# Patient Record
Sex: FEMALE | Race: BLACK OR AFRICAN AMERICAN | NOT HISPANIC OR LATINO | URBAN - METROPOLITAN AREA
[De-identification: names, ages, dates, MRNs, and addresses within clinical notes are randomized per-mention and may not be internally consistent; named-entity substitution may affect disease eponyms.]

---

## 2023-04-21 LAB
CHLAMYDIA TRACH., AMPLIFIED: NEGATIVE
N. GONORRHEA, AMPLIFIED: NEGATIVE

## 2023-04-22 LAB — URINE CULTURE: NORMAL

## 2023-08-28 PROBLEM — O26.892 HEARTBURN DURING PREGNANCY IN SECOND TRIMESTER (HHS-HCC): Status: ACTIVE | Noted: 2023-08-28

## 2023-08-28 PROBLEM — O92.70 UNSPECIFIED DISORDERS OF LACTATION (HHS-HCC): Status: ACTIVE | Noted: 2023-08-28

## 2023-08-28 PROBLEM — D64.9 MILD ANEMIA: Status: ACTIVE | Noted: 2023-08-28

## 2023-08-28 PROBLEM — Z79.899 LONG-TERM USE OF PLAQUENIL: Status: ACTIVE | Noted: 2023-08-28

## 2023-08-28 PROBLEM — N93.8 DYSFUNCTIONAL UTERINE BLEEDING: Status: ACTIVE | Noted: 2023-08-28

## 2023-08-28 PROBLEM — D50.9 IRON DEFICIENCY ANEMIA OF PREGNANCY (HHS-HCC): Status: ACTIVE | Noted: 2023-08-28

## 2023-08-28 PROBLEM — L30.9 ECZEMA: Status: ACTIVE | Noted: 2017-01-17

## 2023-08-28 PROBLEM — R12 HEARTBURN DURING PREGNANCY IN SECOND TRIMESTER (HHS-HCC): Status: ACTIVE | Noted: 2023-08-28

## 2023-08-28 PROBLEM — Z79.620 ADALIMUMAB (HUMIRA) LONG-TERM USE: Status: ACTIVE | Noted: 2023-08-28

## 2023-08-28 PROBLEM — E55.9 VITAMIN D DEFICIENCY: Status: ACTIVE | Noted: 2023-08-28

## 2023-08-28 PROBLEM — Z79.622 LONG-TERM CURRENT USE OF TOFACITINIB: Status: ACTIVE | Noted: 2023-08-28

## 2023-08-28 PROBLEM — M85.80 OSTEOPENIA: Status: ACTIVE | Noted: 2023-08-28

## 2023-08-28 PROBLEM — R07.9 CHEST PAIN: Status: ACTIVE | Noted: 2023-08-28

## 2023-08-28 PROBLEM — A60.09 GENITAL HERPES AFFECTING PREGNANCY IN THIRD TRIMESTER (HHS-HCC): Status: ACTIVE | Noted: 2023-08-28

## 2023-08-28 PROBLEM — M79.672 CHRONIC PAIN IN LEFT FOOT: Status: ACTIVE | Noted: 2023-08-28

## 2023-08-28 PROBLEM — G89.29 CHRONIC PAIN IN LEFT FOOT: Status: ACTIVE | Noted: 2023-08-28

## 2023-08-28 PROBLEM — Z34.02 PRIMIGRAVIDA, SECOND TRIMESTER (HHS-HCC): Status: ACTIVE | Noted: 2023-08-28

## 2023-08-28 PROBLEM — O99.019 IRON DEFICIENCY ANEMIA OF PREGNANCY (HHS-HCC): Status: ACTIVE | Noted: 2023-08-28

## 2023-08-28 PROBLEM — O98.313 GENITAL HERPES AFFECTING PREGNANCY IN THIRD TRIMESTER (HHS-HCC): Status: ACTIVE | Noted: 2023-08-28

## 2023-08-28 PROBLEM — M19.90 ARTHRITIS: Status: ACTIVE | Noted: 2023-08-28

## 2023-08-28 PROBLEM — A59.9 TRICHOMONAS INFECTION: Status: ACTIVE | Noted: 2023-08-28

## 2023-08-28 PROBLEM — J45.909 ASTHMA (HHS-HCC): Status: ACTIVE | Noted: 2023-08-28

## 2023-08-28 RX ORDER — NORELGESTROMIN AND ETHINYL ESTRADIOL 150; 35 UG/D; UG/D
PATCH TRANSDERMAL
COMMUNITY
Start: 2022-10-27 | End: 2024-02-13 | Stop reason: WASHOUT

## 2023-08-28 RX ORDER — HYDROXYCHLOROQUINE SULFATE 200 MG/1
1 TABLET, FILM COATED ORAL DAILY
COMMUNITY
Start: 2021-05-19 | End: 2024-02-09 | Stop reason: SDUPTHER

## 2023-08-28 RX ORDER — IBUPROFEN 600 MG/1
600 TABLET ORAL EVERY 6 HOURS
COMMUNITY
Start: 2021-08-10

## 2023-08-28 RX ORDER — LEVONORGESTREL 1.5 MG/1
TABLET ORAL
COMMUNITY
Start: 2022-10-27 | End: 2024-02-13 | Stop reason: WASHOUT

## 2023-08-28 RX ORDER — PRENATAL VIT NO.126/IRON/FOLIC 28MG-0.8MG
1 TABLET ORAL DAILY
COMMUNITY
Start: 2021-01-19 | End: 2024-02-13 | Stop reason: WASHOUT

## 2023-08-28 RX ORDER — ALBUTEROL SULFATE 90 UG/1
AEROSOL, METERED RESPIRATORY (INHALATION)
COMMUNITY
Start: 2015-07-22

## 2023-08-28 RX ORDER — ADALIMUMAB 40MG/0.4ML
KIT SUBCUTANEOUS
COMMUNITY
Start: 2022-05-13 | End: 2024-02-13 | Stop reason: SDUPTHER

## 2023-08-28 RX ORDER — DOCUSATE SODIUM 100 MG/1
CAPSULE, LIQUID FILLED ORAL
COMMUNITY
Start: 2021-08-10 | End: 2024-02-13 | Stop reason: WASHOUT

## 2023-08-28 RX ORDER — TRIAMCINOLONE ACETONIDE 1 MG/G
CREAM TOPICAL
COMMUNITY
Start: 2015-07-01

## 2023-08-28 RX ORDER — ASPIRIN 325 MG
1 TABLET, DELAYED RELEASE (ENTERIC COATED) ORAL
COMMUNITY
Start: 2022-05-13 | End: 2024-02-13 | Stop reason: WASHOUT

## 2023-08-28 RX ORDER — VIT C/E/ZN/COPPR/LUTEIN/ZEAXAN 250MG-90MG
1 CAPSULE ORAL DAILY
COMMUNITY
Start: 2021-12-17

## 2023-08-28 RX ORDER — CALCIUM CARBONATE 600 MG
1 TABLET ORAL DAILY
COMMUNITY

## 2023-08-28 RX ORDER — ACYCLOVIR 400 MG/1
400 TABLET ORAL 3 TIMES DAILY
COMMUNITY
End: 2024-02-13 | Stop reason: WASHOUT

## 2023-08-28 RX ORDER — METRONIDAZOLE 7.5 MG/G
GEL VAGINAL
COMMUNITY
Start: 2022-10-18 | End: 2024-02-13 | Stop reason: WASHOUT

## 2023-08-28 RX ORDER — FERROUS SULFATE 325(65) MG
TABLET ORAL
COMMUNITY
Start: 2021-08-10 | End: 2024-02-13 | Stop reason: WASHOUT

## 2023-09-15 LAB
CHLAMYDIA TRACH., AMPLIFIED: NEGATIVE
N. GONORRHEA, AMPLIFIED: NEGATIVE
TRICHOMONAS VAGINALIS: NEGATIVE

## 2023-10-03 ENCOUNTER — APPOINTMENT (OUTPATIENT)
Dept: OBSTETRICS AND GYNECOLOGY | Facility: HOSPITAL | Age: 23
End: 2023-10-03
Payer: COMMERCIAL

## 2023-10-19 ENCOUNTER — PHARMACY VISIT (OUTPATIENT)
Dept: PHARMACY | Facility: CLINIC | Age: 23
End: 2023-10-19
Payer: MEDICAID

## 2023-10-19 ENCOUNTER — SPECIALTY PHARMACY (OUTPATIENT)
Dept: PHARMACY | Facility: CLINIC | Age: 23
End: 2023-10-19

## 2023-10-19 PROCEDURE — RXMED WILLOW AMBULATORY MEDICATION CHARGE

## 2023-11-07 ENCOUNTER — SPECIALTY PHARMACY (OUTPATIENT)
Dept: PHARMACY | Facility: CLINIC | Age: 23
End: 2023-11-07

## 2023-11-09 ENCOUNTER — PHARMACY VISIT (OUTPATIENT)
Dept: PHARMACY | Facility: CLINIC | Age: 23
End: 2023-11-09
Payer: MEDICAID

## 2023-11-15 ENCOUNTER — SPECIALTY PHARMACY (OUTPATIENT)
Dept: PHARMACY | Facility: CLINIC | Age: 23
End: 2023-11-15

## 2023-11-15 NOTE — PROGRESS NOTES
We are unable to reach the patient. We left multiple voice meesages to set up delivery for her Humira 40 mg $0.00. This task is now cancelled.

## 2023-11-24 ENCOUNTER — PHARMACY VISIT (OUTPATIENT)
Dept: PHARMACY | Facility: CLINIC | Age: 23
End: 2023-11-24
Payer: MEDICAID

## 2023-11-24 ENCOUNTER — SPECIALTY PHARMACY (OUTPATIENT)
Dept: PHARMACY | Facility: CLINIC | Age: 23
End: 2023-11-24

## 2023-11-24 PROCEDURE — RXMED WILLOW AMBULATORY MEDICATION CHARGE

## 2023-12-19 ENCOUNTER — SPECIALTY PHARMACY (OUTPATIENT)
Dept: PHARMACY | Facility: CLINIC | Age: 23
End: 2023-12-19

## 2023-12-19 ENCOUNTER — PHARMACY VISIT (OUTPATIENT)
Dept: PHARMACY | Facility: CLINIC | Age: 23
End: 2023-12-19
Payer: MEDICAID

## 2023-12-19 PROCEDURE — RXMED WILLOW AMBULATORY MEDICATION CHARGE

## 2024-01-17 PROCEDURE — RXMED WILLOW AMBULATORY MEDICATION CHARGE

## 2024-01-19 ENCOUNTER — PHARMACY VISIT (OUTPATIENT)
Dept: PHARMACY | Facility: CLINIC | Age: 24
End: 2024-01-19
Payer: MEDICAID

## 2024-02-02 ENCOUNTER — TELEPHONE (OUTPATIENT)
Dept: RHEUMATOLOGY | Facility: CLINIC | Age: 24
End: 2024-02-02
Payer: COMMERCIAL

## 2024-02-02 NOTE — TELEPHONE ENCOUNTER
PT CALLED STATING HER INJECTION MISFIRED. HAVING SEVERE RT ARM PAIN. WASN'T ABLE TO GET ANY OF THE MEDICATION. WANTS TO KNOW WHAT SHE SHOULD DO OR IF YOU CAN ORDER HER SOME MEDICATION FOR THE RT ARM PAIN. CURRENTLY OUT OF MEDICATION.

## 2024-02-08 ENCOUNTER — PHARMACY VISIT (OUTPATIENT)
Dept: PHARMACY | Facility: CLINIC | Age: 24
End: 2024-02-08
Payer: MEDICAID

## 2024-02-08 ENCOUNTER — SPECIALTY PHARMACY (OUTPATIENT)
Dept: PHARMACY | Facility: CLINIC | Age: 24
End: 2024-02-08

## 2024-02-08 PROBLEM — M06.9 RHEUMATOID ARTHRITIS INVOLVING MULTIPLE SITES (MULTI): Status: ACTIVE | Noted: 2024-02-08

## 2024-02-08 PROCEDURE — RXMED WILLOW AMBULATORY MEDICATION CHARGE

## 2024-02-08 NOTE — PROGRESS NOTES
Subjective   Patient ID: Romaine Garcia is a 24 y.o. female who presents for Rheumatoid Arthritis (FUV- Pt had misfire last week while injecting Humira. Pt was giving a sample last Friday to be able to stay on track with dosing. Pt would like to discuss adding Hydroxycholroquine.).    HPI  Last seen 1 year ago     · Rheumatoid arthritis (714.0) (M06.9)   · Breast feeding status of mother (V24.1) (Z39.1)   · Vitamin D deficiency (268.9) (E55.9)   · Adalimumab (Humira) long-term use (V58.69) (Z79.620)   · Long-term use of Plaquenil (V58.69) (Z79.899)    Ok until 3 weeks pain swelling hands and wrists  Off Plaquenil for mos ran out   Humira helps     PAIN: wirst and r elbow  SWELLING: +  AM GEL: some days  SIDE EFFECTS OF MED: no    LAST LAB  Lab Results   Component Value Date     (H) 06/07/2019    SEDRATE 26 (H) 05/10/2022    CRP 0.73 05/10/2022         PHYSICAL EXAM  NODES   HEART  LUNGS  ABDOMEN   VASCULAR  NEURO   SKIN  JOINTS   There is currently no information documented on the homunculus. Go to the Rheumatology activity and complete the homunculus joint exam.   Assessment/Plan   Diagnoses and all orders for this visit:  Adalimumab (Humira) long-term use  Long-term use of Plaquenil  Rheumatoid arthritis involving multiple sites, unspecified whether rheumatoid factor present (CMS/[facility])    Last seen 1 year ago she says she has a bad patient and has been noncompliant with appointments    She states she takes the Humira regularly every 2 weeks but when the Plaquenil ran out she never called for refill several months ago    She is now in the state of flare over the past several weeks  Examination reveals active synovitis with flexion contracture and synovial thickening at the right elbow.  There is almost no range of motion of both wrists which are warm and MCPs 2 and 3 left greater than right    Plan:  Stay on Humira every other week.  We may increase to weekly before changing the biologic    Resume  Plaquenil 200 mg twice daily for now    Prescription for prednisone 20 mg decreasing by 5 mg every 3 days till off    Lab work today    See back in 3 months or sooner if flare continues

## 2024-02-09 ENCOUNTER — LAB (OUTPATIENT)
Dept: LAB | Facility: LAB | Age: 24
End: 2024-02-09
Payer: COMMERCIAL

## 2024-02-09 ENCOUNTER — TELEPHONE (OUTPATIENT)
Dept: OBSTETRICS AND GYNECOLOGY | Facility: CLINIC | Age: 24
End: 2024-02-09

## 2024-02-09 ENCOUNTER — OFFICE VISIT (OUTPATIENT)
Dept: RHEUMATOLOGY | Facility: CLINIC | Age: 24
End: 2024-02-09
Payer: COMMERCIAL

## 2024-02-09 VITALS
HEART RATE: 86 BPM | SYSTOLIC BLOOD PRESSURE: 105 MMHG | WEIGHT: 147 LBS | DIASTOLIC BLOOD PRESSURE: 70 MMHG | BODY MASS INDEX: 26.89 KG/M2

## 2024-02-09 DIAGNOSIS — M06.9 RHEUMATOID ARTHRITIS INVOLVING MULTIPLE SITES, UNSPECIFIED WHETHER RHEUMATOID FACTOR PRESENT (MULTI): ICD-10-CM

## 2024-02-09 DIAGNOSIS — Z79.899 LONG-TERM USE OF PLAQUENIL: ICD-10-CM

## 2024-02-09 DIAGNOSIS — Z79.620 ADALIMUMAB (HUMIRA) LONG-TERM USE: Primary | ICD-10-CM

## 2024-02-09 LAB
25(OH)D3 SERPL-MCNC: 18 NG/ML (ref 30–100)
ALBUMIN SERPL BCP-MCNC: 4.1 G/DL (ref 3.4–5)
ALP SERPL-CCNC: 48 U/L (ref 33–110)
ALT SERPL W P-5'-P-CCNC: 7 U/L (ref 7–45)
ANION GAP SERPL CALC-SCNC: 11 MMOL/L (ref 10–20)
AST SERPL W P-5'-P-CCNC: 12 U/L (ref 9–39)
BASOPHILS # BLD AUTO: 0.03 X10*3/UL (ref 0–0.1)
BASOPHILS NFR BLD AUTO: 0.4 %
BILIRUB SERPL-MCNC: 0.2 MG/DL (ref 0–1.2)
BUN SERPL-MCNC: 10 MG/DL (ref 6–23)
CALCIUM SERPL-MCNC: 9.8 MG/DL (ref 8.6–10.6)
CHLORIDE SERPL-SCNC: 109 MMOL/L (ref 98–107)
CO2 SERPL-SCNC: 25 MMOL/L (ref 21–32)
CREAT SERPL-MCNC: 0.6 MG/DL (ref 0.5–1.05)
CRP SERPL-MCNC: 0.89 MG/DL
EGFRCR SERPLBLD CKD-EPI 2021: >90 ML/MIN/1.73M*2
EOSINOPHIL # BLD AUTO: 0.12 X10*3/UL (ref 0–0.7)
EOSINOPHIL NFR BLD AUTO: 1.8 %
ERYTHROCYTE [DISTWIDTH] IN BLOOD BY AUTOMATED COUNT: 11.9 % (ref 11.5–14.5)
ERYTHROCYTE [SEDIMENTATION RATE] IN BLOOD BY WESTERGREN METHOD: 20 MM/H (ref 0–20)
GLUCOSE SERPL-MCNC: 79 MG/DL (ref 74–99)
HCT VFR BLD AUTO: 37.5 % (ref 36–46)
HGB BLD-MCNC: 12.2 G/DL (ref 12–16)
IMM GRANULOCYTES # BLD AUTO: 0.01 X10*3/UL (ref 0–0.7)
IMM GRANULOCYTES NFR BLD AUTO: 0.1 % (ref 0–0.9)
LYMPHOCYTES # BLD AUTO: 2.53 X10*3/UL (ref 1.2–4.8)
LYMPHOCYTES NFR BLD AUTO: 37.2 %
MCH RBC QN AUTO: 29.5 PG (ref 26–34)
MCHC RBC AUTO-ENTMCNC: 32.5 G/DL (ref 32–36)
MCV RBC AUTO: 91 FL (ref 80–100)
MONOCYTES # BLD AUTO: 0.39 X10*3/UL (ref 0.1–1)
MONOCYTES NFR BLD AUTO: 5.7 %
NEUTROPHILS # BLD AUTO: 3.73 X10*3/UL (ref 1.2–7.7)
NEUTROPHILS NFR BLD AUTO: 54.8 %
NRBC BLD-RTO: 0 /100 WBCS (ref 0–0)
PLATELET # BLD AUTO: 352 X10*3/UL (ref 150–450)
POTASSIUM SERPL-SCNC: 4.4 MMOL/L (ref 3.5–5.3)
PROT SERPL-MCNC: 7.4 G/DL (ref 6.4–8.2)
RBC # BLD AUTO: 4.13 X10*6/UL (ref 4–5.2)
SODIUM SERPL-SCNC: 141 MMOL/L (ref 136–145)
WBC # BLD AUTO: 6.8 X10*3/UL (ref 4.4–11.3)

## 2024-02-09 PROCEDURE — 99214 OFFICE O/P EST MOD 30 MIN: CPT | Performed by: INTERNAL MEDICINE

## 2024-02-09 PROCEDURE — 86140 C-REACTIVE PROTEIN: CPT

## 2024-02-09 PROCEDURE — 85025 COMPLETE CBC W/AUTO DIFF WBC: CPT

## 2024-02-09 PROCEDURE — 82306 VITAMIN D 25 HYDROXY: CPT

## 2024-02-09 PROCEDURE — 80053 COMPREHEN METABOLIC PANEL: CPT

## 2024-02-09 PROCEDURE — 36415 COLL VENOUS BLD VENIPUNCTURE: CPT

## 2024-02-09 PROCEDURE — 85652 RBC SED RATE AUTOMATED: CPT

## 2024-02-09 PROCEDURE — 1036F TOBACCO NON-USER: CPT | Performed by: INTERNAL MEDICINE

## 2024-02-09 RX ORDER — HYDROXYCHLOROQUINE SULFATE 200 MG/1
200 TABLET, FILM COATED ORAL 2 TIMES DAILY
Qty: 180 TABLET | Refills: 1 | Status: SHIPPED | OUTPATIENT
Start: 2024-02-09 | End: 2024-02-13 | Stop reason: WASHOUT

## 2024-02-09 RX ORDER — PREDNISONE 5 MG/1
TABLET ORAL
Qty: 50 TABLET | Refills: 0 | Status: SHIPPED | OUTPATIENT
Start: 2024-02-09 | End: 2024-02-13 | Stop reason: WASHOUT

## 2024-02-09 NOTE — TELEPHONE ENCOUNTER
----- Message from Maryjane Phoenix RN sent at 2/9/2024  8:41 AM EST -----  Regarding: refill  Contact: [phone]  Refill needed on birth control

## 2024-02-09 NOTE — TELEPHONE ENCOUNTER
RN called pt and she reports she was given rx for birth control  plls following  at  and wants a refill. Discussed she may call  as she has 2  pills left and we can then schedule her an appointment here. Last review shows pt was on the birth control patch but she states she is not on this.  Return call to pt- she is scheduled for appt her next TU. Pt reports she is on her menstural cycle now. Did not call  for refill

## 2024-02-13 ENCOUNTER — TELEPHONE (OUTPATIENT)
Dept: RHEUMATOLOGY | Facility: CLINIC | Age: 24
End: 2024-02-13

## 2024-02-13 ENCOUNTER — OFFICE VISIT (OUTPATIENT)
Dept: OBSTETRICS AND GYNECOLOGY | Facility: CLINIC | Age: 24
End: 2024-02-13
Payer: COMMERCIAL

## 2024-02-13 VITALS
DIASTOLIC BLOOD PRESSURE: 70 MMHG | SYSTOLIC BLOOD PRESSURE: 110 MMHG | WEIGHT: 148.8 LBS | HEIGHT: 62 IN | BODY MASS INDEX: 27.38 KG/M2 | HEART RATE: 86 BPM

## 2024-02-13 DIAGNOSIS — E55.9 VITAMIN D DEFICIENCY: Primary | ICD-10-CM

## 2024-02-13 DIAGNOSIS — Z30.41 ENCOUNTER FOR SURVEILLANCE OF CONTRACEPTIVE PILLS: Primary | ICD-10-CM

## 2024-02-13 DIAGNOSIS — E55.9 VITAMIN D DEFICIENCY: ICD-10-CM

## 2024-02-13 PROCEDURE — 99213 OFFICE O/P EST LOW 20 MIN: CPT | Mod: GC

## 2024-02-13 PROCEDURE — 99213 OFFICE O/P EST LOW 20 MIN: CPT

## 2024-02-13 PROCEDURE — 1036F TOBACCO NON-USER: CPT

## 2024-02-13 RX ORDER — NORGESTIMATE AND ETHINYL ESTRADIOL 0.25-0.035
1 KIT ORAL DAILY
Qty: 360 TABLET | Refills: 0 | Status: SHIPPED | OUTPATIENT
Start: 2024-02-13 | End: 2024-02-14

## 2024-02-13 RX ORDER — CHOLECALCIFEROL (VITAMIN D3) 1250 MCG
TABLET ORAL
Qty: 12 TABLET | Refills: 1 | Status: SHIPPED | OUTPATIENT
Start: 2024-02-13 | End: 2024-02-13 | Stop reason: WASHOUT

## 2024-02-13 RX ORDER — ETONOGESTREL AND ETHINYL ESTRADIOL .12; .015 MG/D; MG/D
RING VAGINAL
COMMUNITY
Start: 2023-04-26 | End: 2024-02-13 | Stop reason: WASHOUT

## 2024-02-13 RX ORDER — CHOLECALCIFEROL (VITAMIN D3) 1250 MCG
TABLET ORAL
Qty: 12 TABLET | Refills: 1 | Status: SHIPPED | OUTPATIENT
Start: 2024-02-13

## 2024-02-13 ASSESSMENT — PAIN SCALES - GENERAL: PAINLEVEL: 0-NO PAIN

## 2024-02-13 NOTE — PROGRESS NOTES
Subjective   Patient ID: Romaine Garcia is a 24 y.o. female who presents for Contraception (PT here for BC refill/LMP 1/10/2024/STI Screening declined/Chaperone declined).  HPI  - Pt reports no concerns at this time.   -S/p surgical termination of pregnancy in November. Prescribed Norethindrone-Ethinyl estradiol 0.25mg/0.35mg-mcg     Review of Systems   All other systems reviewed and are negative.      Objective   Physical Exam  Constitutional:       Appearance: Normal appearance.   HENT:      Head: Normocephalic and atraumatic.      Nose: Nose normal.      Mouth/Throat:      Mouth: Mucous membranes are moist.      Pharynx: No oropharyngeal exudate or posterior oropharyngeal erythema.   Eyes:      Extraocular Movements: Extraocular movements intact.      Conjunctiva/sclera: Conjunctivae normal.   Pulmonary:      Effort: Pulmonary effort is normal.   Musculoskeletal:      Cervical back: Normal range of motion.   Skin:     Findings: No bruising, erythema, lesion or rash.   Neurological:      General: No focal deficit present.      Mental Status: She is alert.   Psychiatric:         Mood and Affect: Mood normal.         Behavior: Behavior normal.         Thought Content: Thought content normal.         Judgment: Judgment normal.         Assessment/Plan   .  Problem List Items Addressed This Visit       Encounter for surveillance of contraceptive pills - Primary    Overview     -Currently on Norgee 0.25/0.35mg/mcg          Current Assessment & Plan     -refill for Norgestimate-ethinyl estradiol 0.25-0.35mg/mcg         Relevant Medications    norgestimate-ethinyl estradioL (Ortho-Cyclen) 0.25-35 mg-mcg tablet    Other Relevant Orders    Follow Up In Gynecology       RTC for annual exam     Seen and d/w Dr. Layne Flannery MD, PGY-2        Jennifer Flannery MD 02/13/24 11:46 AM

## 2024-02-23 ENCOUNTER — TELEPHONE (OUTPATIENT)
Dept: RHEUMATOLOGY | Facility: CLINIC | Age: 24
End: 2024-02-23
Payer: COMMERCIAL

## 2024-02-23 DIAGNOSIS — M06.9 RHEUMATOID ARTHRITIS INVOLVING MULTIPLE SITES, UNSPECIFIED WHETHER RHEUMATOID FACTOR PRESENT (MULTI): Primary | ICD-10-CM

## 2024-02-23 RX ORDER — PREDNISONE 5 MG/1
TABLET ORAL
Qty: 30 TABLET | Refills: 0 | Status: SHIPPED | OUTPATIENT
Start: 2024-02-23

## 2024-02-23 NOTE — TELEPHONE ENCOUNTER
Romaine Pérez states that her pharmacy did not get her medication refill for her prednisone. She would like it sent to the [facility] pharmacy in [place] that is listed in her chart.     Thanks

## 2024-03-08 ENCOUNTER — SPECIALTY PHARMACY (OUTPATIENT)
Dept: PHARMACY | Facility: CLINIC | Age: 24
End: 2024-03-08

## 2024-03-08 PROCEDURE — RXMED WILLOW AMBULATORY MEDICATION CHARGE

## 2024-03-11 ENCOUNTER — PHARMACY VISIT (OUTPATIENT)
Dept: PHARMACY | Facility: CLINIC | Age: 24
End: 2024-03-11
Payer: MEDICAID

## 2024-04-03 DIAGNOSIS — M05.79 RHEUMATOID ARTHRITIS INVOLVING MULTIPLE SITES WITH POSITIVE RHEUMATOID FACTOR (MULTI): Primary | ICD-10-CM

## 2024-04-03 RX ORDER — ADALIMUMAB 40MG/0.4ML
KIT SUBCUTANEOUS
Qty: 8 EACH | Refills: 6 | Status: SHIPPED | OUTPATIENT
Start: 2024-04-03 | End: 2025-04-03

## 2024-04-04 PROCEDURE — RXMED WILLOW AMBULATORY MEDICATION CHARGE

## 2024-04-08 DIAGNOSIS — M06.9 RHEUMATOID ARTHRITIS, UNSPECIFIED (MULTI): ICD-10-CM

## 2024-04-09 ENCOUNTER — SPECIALTY PHARMACY (OUTPATIENT)
Dept: PHARMACY | Facility: CLINIC | Age: 24
End: 2024-04-09

## 2024-04-09 ENCOUNTER — PHARMACY VISIT (OUTPATIENT)
Dept: PHARMACY | Facility: CLINIC | Age: 24
End: 2024-04-09
Payer: MEDICAID

## 2024-04-09 RX ORDER — HYDROXYCHLOROQUINE SULFATE 200 MG/1
TABLET, FILM COATED ORAL DAILY
Qty: 30 TABLET | Refills: 11 | Status: SHIPPED | OUTPATIENT
Start: 2024-04-09

## 2024-05-02 ENCOUNTER — SPECIALTY PHARMACY (OUTPATIENT)
Dept: PHARMACY | Facility: CLINIC | Age: 24
End: 2024-05-02

## 2024-05-03 ENCOUNTER — TELEPHONE (OUTPATIENT)
Dept: OBSTETRICS AND GYNECOLOGY | Facility: CLINIC | Age: 24
End: 2024-05-03
Payer: COMMERCIAL

## 2024-05-03 DIAGNOSIS — Z30.41 ENCOUNTER FOR SURVEILLANCE OF CONTRACEPTIVE PILLS: ICD-10-CM

## 2024-05-03 RX ORDER — NORGESTIMATE AND ETHINYL ESTRADIOL 0.25-0.035
1 KIT ORAL DAILY
Qty: 84 TABLET | Refills: 3 | Status: SHIPPED | OUTPATIENT
Start: 2024-05-03

## 2024-05-03 NOTE — TELEPHONE ENCOUNTER
----- Message from Shawnee Wolff LPN sent at 5/3/2024  9:57 AM EDT -----  Regarding: FW: Birth Control   Contact: [phone]  [place] pt  ----- Message -----  From: Romaine Garcia  Sent: 5/3/2024   7:11 AM EDT  To:  [id number] Clinical Support Staff  Subject: Birth Control                                    Hello   According to my pharmacy, the prescription sent for my birth control was discontinued and they can no longer refill it and I don’t have any more and I am in need of a refill. You guys gave me a years supply but I am  unable to access it. I am in need of a refill as of tomorrow!

## 2024-05-03 NOTE — TELEPHONE ENCOUNTER
Dr Munroe will put in rx  ----- Message from Shawnee Wolff LPN sent at 5/3/2024  9:57 AM EDT -----  Regarding: FW: Birth Control   Contact: [phone]  [place] pt  ----- Message -----  From: Romaine Garcia  Sent: 5/3/2024   7:11 AM EDT  To: [id number] Clinical [facility]  Subject: Birth Control                                    Hello   According to my pharmacy, the prescription sent for my birth control was discontinued and they can no longer refill it and I don’t have any more and I am in need of a refill. You guys gave me a years supply but I am  unable to access it. I am in need of a refill as of tomorrow!

## 2024-07-08 ENCOUNTER — SPECIALTY PHARMACY (OUTPATIENT)
Dept: PHARMACY | Facility: CLINIC | Age: 24
End: 2024-07-08

## 2024-07-08 PROCEDURE — RXMED WILLOW AMBULATORY MEDICATION CHARGE

## 2024-07-09 ENCOUNTER — PHARMACY VISIT (OUTPATIENT)
Dept: PHARMACY | Facility: CLINIC | Age: 24
End: 2024-07-09
Payer: MEDICAID

## 2024-09-27 ENCOUNTER — SPECIALTY PHARMACY (OUTPATIENT)
Dept: PHARMACY | Facility: CLINIC | Age: 24
End: 2024-09-27

## 2024-09-27 PROCEDURE — RXMED WILLOW AMBULATORY MEDICATION CHARGE

## 2024-10-09 ENCOUNTER — SPECIALTY PHARMACY (OUTPATIENT)
Dept: PHARMACY | Facility: CLINIC | Age: 24
End: 2024-10-09

## 2024-10-11 ENCOUNTER — PHARMACY VISIT (OUTPATIENT)
Dept: PHARMACY | Facility: CLINIC | Age: 24
End: 2024-10-11
Payer: MEDICAID

## 2024-10-22 ENCOUNTER — OFFICE VISIT (OUTPATIENT)
Dept: OBSTETRICS AND GYNECOLOGY | Facility: CLINIC | Age: 24
End: 2024-10-22
Payer: COMMERCIAL

## 2024-10-22 VITALS
SYSTOLIC BLOOD PRESSURE: 105 MMHG | DIASTOLIC BLOOD PRESSURE: 70 MMHG | WEIGHT: 146.7 LBS | BODY MASS INDEX: 27 KG/M2 | HEIGHT: 62 IN | HEART RATE: 74 BPM

## 2024-10-22 DIAGNOSIS — N89.8 VAGINAL DISCHARGE: ICD-10-CM

## 2024-10-22 DIAGNOSIS — Z32.02 PREGNANCY TEST NEGATIVE: Primary | ICD-10-CM

## 2024-10-22 LAB — PREGNANCY TEST URINE, POC: NEGATIVE

## 2024-10-22 PROCEDURE — 87591 N.GONORRHOEAE DNA AMP PROB: CPT

## 2024-10-22 PROCEDURE — 87205 SMEAR GRAM STAIN: CPT

## 2024-10-22 PROCEDURE — 87661 TRICHOMONAS VAGINALIS AMPLIF: CPT

## 2024-10-22 PROCEDURE — 81025 URINE PREGNANCY TEST: CPT

## 2024-10-22 PROCEDURE — 87491 CHLMYD TRACH DNA AMP PROBE: CPT

## 2024-10-22 ASSESSMENT — ENCOUNTER SYMPTOMS
EYES NEGATIVE: 0
NEUROLOGICAL NEGATIVE: 0
HEMATOLOGIC/LYMPHATIC NEGATIVE: 0
PSYCHIATRIC NEGATIVE: 0
MUSCULOSKELETAL NEGATIVE: 0
CONSTITUTIONAL NEGATIVE: 0
RESPIRATORY NEGATIVE: 0
CARDIOVASCULAR NEGATIVE: 0
ALLERGIC/IMMUNOLOGIC NEGATIVE: 0
GASTROINTESTINAL NEGATIVE: 0
ENDOCRINE NEGATIVE: 0

## 2024-10-22 ASSESSMENT — PATIENT HEALTH QUESTIONNAIRE - PHQ9
1. LITTLE INTEREST OR PLEASURE IN DOING THINGS: NOT AT ALL
2. FEELING DOWN, DEPRESSED OR HOPELESS: NOT AT ALL
SUM OF ALL RESPONSES TO PHQ9 QUESTIONS 1 AND 2: 0

## 2024-10-22 ASSESSMENT — PAIN SCALES - GENERAL: PAINLEVEL_OUTOF10: 0-NO PAIN

## 2024-10-22 NOTE — PROGRESS NOTES
Subjective   Patient ID: Romaine Garcia is a 24 y.o. female who presents for Possible Pregnancy (Patient is here today for a pregnancy test and wants a vaginal exam/No pain /LMP unknown /No falls /Last pap 23 normal /STD urine only /Chaperone declined ).  HPI     p/f preg test    Had light period for only 2-3 days this past month (instead of usual 4-5 days) so presenting for UPT. Negative in clinic.    Reports some vaginal discharge, no other assoc symptoms.    On ortho-cyclen OCPs- happy with them. Regular periods.  Last pap  wnl (for next in )  Feels safe at home/with partner.    Review of Systems    Objective   Physical Exam  Constitutional: No visible distress, alert and cooperative  Respiratory/Thorax: Normal respiratory effort on RA  Cardiovascular: Reg rate  Gastrointestinal: soft, nondistended, nontender  Neurological: A&Ox3  Psychological: Appropriate mood and behavior  : cervix, vagina, urethra, ext genitalia normal appearing. Vaginitis swab collected. No significant discharge noted.    Assessment/Plan   Problem List Items Addressed This Visit       Pregnancy test negative - Primary    Relevant Orders    POCT pregnancy, urine manually resulted (Completed)    Vaginal discharge    Overview     - Overall reassuring exam with only scant discharge, no other abnormalities. Will fu results.         Relevant Orders    Trichomonas vaginalis, Amplified    Chlamydia trachomatis, Amplified    Neisseria gonorrhoeae, Amplified    Vaginitis Gram Stain For Bacterial Vaginosis + Yeast   UPT neg in office.       Pt seen and d/w Dr. Ludwig Reynolds MD 10/22/24 10:37 AM

## 2024-10-23 LAB
C TRACH RRNA SPEC QL NAA+PROBE: NEGATIVE
CLUE CELLS VAG LPF-#/AREA: ABNORMAL /[LPF]
N GONORRHOEA DNA SPEC QL PROBE+SIG AMP: NEGATIVE
NUGENT SCORE: 7
T VAGINALIS RRNA SPEC QL NAA+PROBE: NEGATIVE
YEAST VAG WET PREP-#/AREA: ABNORMAL

## 2024-10-24 ENCOUNTER — TELEPHONE (OUTPATIENT)
Dept: OBSTETRICS AND GYNECOLOGY | Facility: CLINIC | Age: 24
End: 2024-10-24
Payer: COMMERCIAL

## 2024-10-24 DIAGNOSIS — N76.0 BACTERIAL VAGINOSIS: Primary | ICD-10-CM

## 2024-10-24 DIAGNOSIS — B96.89 BACTERIAL VAGINOSIS: Primary | ICD-10-CM

## 2024-10-24 RX ORDER — METRONIDAZOLE 500 MG/1
500 TABLET ORAL 2 TIMES DAILY
Qty: 14 TABLET | Refills: 0 | Status: SHIPPED | OUTPATIENT
Start: 2024-10-24 | End: 2024-10-31

## 2024-11-05 DIAGNOSIS — B37.9 YEAST INFECTION: ICD-10-CM

## 2024-11-05 DIAGNOSIS — N76.0 ACUTE VAGINITIS: Primary | ICD-10-CM

## 2024-11-05 RX ORDER — FLUCONAZOLE 150 MG/1
150 TABLET ORAL ONCE
Qty: 2 TABLET | Refills: 0 | Status: SHIPPED | OUTPATIENT
Start: 2024-11-05 | End: 2024-11-05

## 2024-11-05 RX ORDER — FLUCONAZOLE 150 MG/1
150 TABLET ORAL ONCE
Qty: 2 TABLET | Refills: 0 | Status: CANCELLED | OUTPATIENT
Start: 2024-11-05 | End: 2024-11-05

## 2024-11-07 PROCEDURE — RXMED WILLOW AMBULATORY MEDICATION CHARGE

## 2024-11-13 ENCOUNTER — SPECIALTY PHARMACY (OUTPATIENT)
Dept: PHARMACY | Facility: CLINIC | Age: 24
End: 2024-11-13

## 2024-11-18 ENCOUNTER — PHARMACY VISIT (OUTPATIENT)
Dept: PHARMACY | Facility: CLINIC | Age: 24
End: 2024-11-18
Payer: MEDICAID

## 2024-12-09 ENCOUNTER — SPECIALTY PHARMACY (OUTPATIENT)
Dept: PHARMACY | Facility: CLINIC | Age: 24
End: 2024-12-09

## 2024-12-09 PROCEDURE — RXMED WILLOW AMBULATORY MEDICATION CHARGE

## 2024-12-12 ENCOUNTER — PHARMACY VISIT (OUTPATIENT)
Dept: PHARMACY | Facility: CLINIC | Age: 24
End: 2024-12-12
Payer: MEDICAID

## 2025-01-02 NOTE — PROGRESS NOTES
I saw and evaluated the patient. I personally obtained the key and critical portions of the history and physical exam or was physically present for key and critical portions performed by the resident/fellow. I reviewed the resident/fellow's documentation and discussed the patient with the resident/fellow. I agree with the resident/fellow's medical decision making as documented in the note.    Modesta Munroe MD

## 2025-01-09 ENCOUNTER — SPECIALTY PHARMACY (OUTPATIENT)
Dept: PHARMACY | Facility: CLINIC | Age: 25
End: 2025-01-09

## 2025-01-09 PROCEDURE — RXMED WILLOW AMBULATORY MEDICATION CHARGE

## 2025-01-13 ENCOUNTER — PHARMACY VISIT (OUTPATIENT)
Dept: PHARMACY | Facility: CLINIC | Age: 25
End: 2025-01-13
Payer: MEDICAID

## 2025-02-23 ENCOUNTER — SPECIALTY PHARMACY (OUTPATIENT)
Dept: PHARMACY | Facility: CLINIC | Age: 25
End: 2025-02-23

## 2025-02-23 PROCEDURE — RXMED WILLOW AMBULATORY MEDICATION CHARGE

## 2025-02-25 ENCOUNTER — PHARMACY VISIT (OUTPATIENT)
Dept: PHARMACY | Facility: CLINIC | Age: 25
End: 2025-02-25
Payer: MEDICAID

## 2025-03-19 ENCOUNTER — SPECIALTY PHARMACY (OUTPATIENT)
Dept: PHARMACY | Facility: CLINIC | Age: 25
End: 2025-03-19

## 2025-03-20 ENCOUNTER — OFFICE VISIT (OUTPATIENT)
Dept: OBSTETRICS AND GYNECOLOGY | Facility: CLINIC | Age: 25
End: 2025-03-20
Payer: COMMERCIAL

## 2025-03-20 VITALS
DIASTOLIC BLOOD PRESSURE: 65 MMHG | SYSTOLIC BLOOD PRESSURE: 96 MMHG | HEART RATE: 102 BPM | BODY MASS INDEX: 24.09 KG/M2 | WEIGHT: 131.7 LBS

## 2025-03-20 DIAGNOSIS — Z11.3 SCREENING EXAMINATION FOR SEXUALLY TRANSMITTED DISEASE: Primary | ICD-10-CM

## 2025-03-20 DIAGNOSIS — Z30.41 ENCOUNTER FOR SURVEILLANCE OF CONTRACEPTIVE PILLS: ICD-10-CM

## 2025-03-20 DIAGNOSIS — Z01.419 WELL WOMAN EXAM: ICD-10-CM

## 2025-03-20 DIAGNOSIS — N89.8 VAGINAL ODOR: ICD-10-CM

## 2025-03-20 DIAGNOSIS — M06.9 RHEUMATOID ARTHRITIS, UNSPECIFIED: ICD-10-CM

## 2025-03-20 DIAGNOSIS — E55.9 VITAMIN D DEFICIENCY: ICD-10-CM

## 2025-03-20 LAB — PREGNANCY TEST URINE, POC: NEGATIVE

## 2025-03-20 PROCEDURE — 81025 URINE PREGNANCY TEST: CPT

## 2025-03-20 PROCEDURE — 99395 PREV VISIT EST AGE 18-39: CPT

## 2025-03-20 PROCEDURE — 99395 PREV VISIT EST AGE 18-39: CPT | Mod: GC

## 2025-03-20 PROCEDURE — 1036F TOBACCO NON-USER: CPT

## 2025-03-20 RX ORDER — PSYLLIUM HUSK 0.4 G
1500 CAPSULE ORAL DAILY
Qty: 30 TABLET | Refills: 1 | Status: SHIPPED | OUTPATIENT
Start: 2025-03-20 | End: 2025-05-19

## 2025-03-20 RX ORDER — NORGESTIMATE AND ETHINYL ESTRADIOL 0.25-0.035
1 KIT ORAL DAILY
Qty: 84 TABLET | Refills: 3 | Status: SHIPPED | OUTPATIENT
Start: 2025-03-20

## 2025-03-20 RX ORDER — CHOLECALCIFEROL (VITAMIN D3) 1250 MCG
TABLET ORAL
Qty: 12 TABLET | Refills: 1 | Status: SHIPPED | OUTPATIENT
Start: 2025-03-20

## 2025-03-20 ASSESSMENT — ENCOUNTER SYMPTOMS
MUSCULOSKELETAL NEGATIVE: 0
CARDIOVASCULAR NEGATIVE: 0
EYES NEGATIVE: 0
HEMATOLOGIC/LYMPHATIC NEGATIVE: 0
ENDOCRINE NEGATIVE: 0
RESPIRATORY NEGATIVE: 0
CONSTITUTIONAL NEGATIVE: 0
NEUROLOGICAL NEGATIVE: 0
PSYCHIATRIC NEGATIVE: 0
GASTROINTESTINAL NEGATIVE: 0
ALLERGIC/IMMUNOLOGIC NEGATIVE: 0

## 2025-03-20 ASSESSMENT — PAIN SCALES - GENERAL: PAINLEVEL_OUTOF10: 0-NO PAIN

## 2025-03-20 NOTE — PROGRESS NOTES
Assessment   PLAN  Thank you for coming to your annual exam. We discussed healthy lifestyle, well woman screening, and other diagnoses listed below.    Romaine was seen today for annual exam.  Diagnoses and all orders for this visit:  Screening examination for sexually transmitted disease (Primary)  -     C. trachomatis / N. gonorrhoeae, Amplified, Urogenital; Future  -     Trichomonas vaginalis, Amplified; Future  -     Trichomonas vaginalis, Amplified  -     C. trachomatis / N. gonorrhoeae, Amplified, Urogenital  Well woman exam  -     POCT pregnancy, urine manually resulted  -     calcium carbonate 600 mg calcium (1,500 mg) tablet; Take 1 tablet (1,500 mg) by mouth once daily.  Encounter for surveillance of contraceptive pills  -     norgestimate-ethinyl estradiol (Dian) 0.25-35 mg-mcg tablet; Take 1 tablet by mouth once daily.  Vitamin D deficiency  -     cholecalciferol (Vitamin D3) 1,250 mcg (50,000 unit) tablet; Take once a week for 3 mos then q other week thereafter  Rheumatoid arthritis, unspecified  Vaginal odor  -     Vaginitis Gram Stain For Bacterial Vaginosis + Yeast  Pap hx:  - Last pap 2023 NILM  - Next due 2026    Please return for your next visit in 1 year.    Kimberly Suarez MD    Subjective     Romaine Garcia is a 25 y.o. female who is here for a routine exam.   PCP = Paola Barrow MD    APE Concerns: none    Other Concerns:   Refills of OCPs  Mild vaginal odor, no itching or irritation, white vaginal discharge    OB History    Para Term  AB Living   5 1 1 0 4 1   SAB IAB Ectopic Multiple Live Births   0 0 0 0 1      # Outcome Date GA Lbr Timothy/2nd Weight Sex Type Anes PTL Lv   5 AB            4 AB            3 AB            2 AB            1 Term      Vag-Spont  N SOLOMON       GynHx:  Menstrual Pattern: regular, monthly with placebo week of OCPs, one day of heavy bleeding, lasts 7 days; sometimes has severe cramping  STIs: denies  Sexual Activity: men, monogamous, no  complaints  Contraception: OCPs, happy with them    Pap Hx: last 9/2023 NILM    Preventative:  Last mammogram: due age 39  Last Colonoscopy: due age 44  DM Screening: due age 34  DEXA: due age 64  HPV vaccination: done    SoHx:  Living Situation: with son  School/Employment: LPN  Substance: No T/D. EtOH social.  Abuse: denies  Depression Screen: negative    Past Medical History:   Diagnosis Date    Asthma, exercise induced ([facility]-HCC)     Rheumatoid arthritis, unspecified 05/13/2022    Rheumatoid arthritis       History reviewed. No pertinent surgical history.    Family History   Problem Relation Name Age of Onset    Arthritis Mother's Sister      Hypertension Mother's Sister      Arthritis Other maternal relatives        Objective   BP 96/65   Pulse 102   Wt 59.7 kg (131 lb 11.2 oz)   LMP 02/20/2025 (Approximate)   BMI 24.09 kg/m²      General:   Alert and oriented, in no acute distress   Skin: No significant acne. No hirsutism.   Neck: Supple. No visible thyromegaly.    Abdomen: Soft, non-tender, without masses or organomegaly   Vulva: Normal architecture without erythema, masses, or lesions.    Vagina: Normal mucosa without lesions, masses, or atrophy. No abnormal vaginal discharge.    Cervix: Normal without masses, lesions, or signs of cervicitis.    Uterus: Normal mobile, non-enlarged uterus    Adnexa: Normal without masses or lesions   Pelvic Floor No POP noted. No high tone pelvic floor    Psych Normal affect. Normal mood.

## 2025-03-20 NOTE — PROGRESS NOTES
I saw and evaluated the patient. I personally obtained the key and critical portions of the history and physical exam or was physically present for key and critical portions performed by the resident/fellow. I reviewed the resident/fellow's documentation and discussed the patient with the resident/fellow. I agree with the resident/fellow's medical decision making as documented in the note.    Tess Pack MD

## 2025-03-21 LAB
BV SCORE VAG QL: NORMAL
C TRACH RRNA SPEC QL NAA+PROBE: NOT DETECTED
N GONORRHOEA RRNA SPEC QL NAA+PROBE: NOT DETECTED
QUEST GC CT AMPLIFIED (ALWAYS MESSAGE): NORMAL
T VAGINALIS RRNA SPEC QL NAA+PROBE: NOT DETECTED

## 2025-03-28 ENCOUNTER — SPECIALTY PHARMACY (OUTPATIENT)
Dept: PHARMACY | Facility: CLINIC | Age: 25
End: 2025-03-28

## 2025-03-28 PROCEDURE — RXMED WILLOW AMBULATORY MEDICATION CHARGE

## 2025-03-29 ENCOUNTER — PHARMACY VISIT (OUTPATIENT)
Dept: PHARMACY | Facility: CLINIC | Age: 25
End: 2025-03-29
Payer: MEDICAID

## 2025-03-31 ENCOUNTER — TELEPHONE (OUTPATIENT)
Dept: OBSTETRICS AND GYNECOLOGY | Facility: CLINIC | Age: 25
End: 2025-03-31
Payer: COMMERCIAL

## 2025-03-31 DIAGNOSIS — N89.8 VAGINAL DISCHARGE: Primary | ICD-10-CM

## 2025-03-31 RX ORDER — METRONIDAZOLE 7.5 MG/G
GEL VAGINAL DAILY
Qty: 70 G | Refills: 0 | Status: SHIPPED | OUTPATIENT
Start: 2025-03-31 | End: 2025-04-05

## 2025-03-31 NOTE — TELEPHONE ENCOUNTER
----- Message from Nurse Daisha MOYA sent at 3/28/2025 11:27 AM EDT -----  Regarding: Prescription request  Contact: [phone]  Per ;    Requesting rx from appt 1 week ago, [phone]

## 2025-04-01 ENCOUNTER — TELEPHONE (OUTPATIENT)
Dept: OBSTETRICS AND GYNECOLOGY | Facility: CLINIC | Age: 25
End: 2025-04-01
Payer: COMMERCIAL

## 2025-04-01 NOTE — TELEPHONE ENCOUNTER
----- Message from Nurse Daisha MOYA sent at 3/28/2025 11:27 AM EDT -----  Regarding: Prescription request  Contact: [phone]  Per ;    Requesting rx from appt 1 week ago, [phone]

## 2025-04-01 NOTE — TELEPHONE ENCOUNTER
Call back to pt.  Provider sent in RX to treat pt based on symptoms for BV. Pt reported continued odor and discharge - in prior lab treated and based on kelsey score.  Pt informed provider sent in  metrogel yesterday.

## 2025-04-01 NOTE — PROGRESS NOTES
Subjective   Patient ID: Romaine Garcia is a 25 y.o. female who presents for Follow-up (FUV-patient in for R arm pain unable to move arm and L shoulder pain.).    HPI  Last seen over 1 year ago feb 2024    Good until 6 mos and elbow can not move R elbow and l arm aches    Missed one or 2 humira    Ran out of plaquenil few mos ago     Eye appt few mos ago    Working nursing home      · Rheumatoid arthritis (714.0) (M06.9)   · Breast feeding status of mother (V24.1) (Z39.1)   · Vitamin D deficiency (268.9) (E55.9)   · Adalimumab (Humira) long-term use (V58.69) (Z79.620)   · Long-term use of Plaquenil (V58.69) (Z79.899)    Ok until 3 weeks pain swelling hands and wrists  Off Plaquenil for mos ran out   Humira helps     PAIN: wirst and r elbow shoulders hands  SWELLING: +  AM GEL: prolonged  SIDE EFFECTS OF MED: no    LAST LAB  Lab Results   Component Value Date     (H) 06/07/2019    SEDRATE 20 02/09/2024    CRP 0.89 02/09/2024         PHYSICAL EXAM  NODES -  HEART-  LUNGS-  ABDOMEN   VASCULAR-  NEURO -  SKIN-  JOINTS Active synovitis both shoulders with LOM   Both elbows with Fc 45 deg R 20 DEG L with synovitis  MCPS bari   PIPS  Knees ok  Syn thick ankles   There is currently no information documented on the homunculus. Go to the Rheumatology activity and complete the homunculus joint exam.   Assessment/Plan   Diagnoses and all orders for this visit:  Adalimumab (Humira) long-term use  Vitamin D deficiency  -     C-Reactive Protein; Future  -     Sedimentation Rate; Future  -     CBC and Auto Differential; Future  -     T-Spot TB; Future  -     Hepatitis Panel, Acute; Future  -     Comprehensive Metabolic Panel; Future  -     Vitamin D 25-Hydroxy,Total (for eval of Vitamin D levels); Future  Long-term use of Plaquenil  -     C-Reactive Protein; Future  -     Sedimentation Rate; Future  -     CBC and Auto Differential; Future  -     T-Spot TB; Future  -     Hepatitis Panel, Acute; Future  -     Comprehensive  Metabolic Panel; Future  -     Vitamin D 25-Hydroxy,Total (for eval of Vitamin D levels); Future  Rheumatoid arthritis involving multiple sites with positive rheumatoid factor (Multi)  -     C-Reactive Protein; Future  -     Sedimentation Rate; Future  -     CBC and Auto Differential; Future  -     T-Spot TB; Future  -     Hepatitis Panel, Acute; Future  -     Comprehensive Metabolic Panel; Future  -     Vitamin D 25-Hydroxy,Total (for eval of Vitamin D levels); Future      Last seen 1 year ago she says she has been a bad patient and has been noncompliant with appointments    She states she takes the Humira regularly every 2 weeks but when the Plaquenil ran out she never called for refill several months ago    She is now in the state of flare over the past several weeks    Examination reveals active synovitis with flexion contracture and synovial thickening at the right elbow.  There is almost no range of motion of both wrists which are warm and MCPs 2 and 3 left greater than right. See pe for more details     Plan:  Patient is noncompliant with at least medications however she states she has been taking Humira    She ran out of Plaquenil months ago and never called for a prescription    She has significant pain morning stiffness and active synovitis at shoulders elbows with flexion contractures bilaterally right greater than left synovitis at MCP PIPs both ankles and shoulders.    We will discontinue the Humira and order Actemra but continue the Humira until the latter drug arrives    I will give her prescription for Plaquenil  However she states she does not want to take oral medications because she is noncompliant    Hopefully she will be compliant with her subcutaneous injections    She will get blood work today including CBC CMP WSR CRP TB spot and hepatitis panel before starting the Actemra    I really talked to her about the need to be compliant and the need to call if she runs out of medications she states  she will be doing better now.    I will see her back in 6 weeks.    I may need to inject elbows at next visit

## 2025-04-01 NOTE — TELEPHONE ENCOUNTER
----- Message from Kimberly Suarez sent at 3/31/2025  5:59 PM EDT -----  Regarding: RE: Prescription request  Contact: [phone]  I will send the metrogel but if her symptoms persist she will need another appointment for further eval.  ----- Message -----  From: Theodora Saab RN  Sent: 3/28/2025   3:20 PM EDT  To: Kimberly Suarez MD  Subject: FW: Prescription request                         Hello,  Pt seen 3/20/25  neg sti and BV, Yeast. She states she still has a vag odor, yellow white discharge.  Last time she had a kelsey score and was treated based on that. This testing from [facility] does not provide that .    She feels she has BV and is requesting metrogel. I have let her know I will send this to your for review.    Thanks  ----- Message -----  From: Daisha Anne RN  Sent: 3/28/2025  11:28 AM EDT  To: [email or web address] Clinical Support Staff  Subject: Prescription request                             Per ;    Requesting rx from appt 1 week ago, [phone]

## 2025-04-02 ENCOUNTER — APPOINTMENT (OUTPATIENT)
Dept: RHEUMATOLOGY | Facility: CLINIC | Age: 25
End: 2025-04-02
Payer: COMMERCIAL

## 2025-04-02 VITALS
OXYGEN SATURATION: 100 % | BODY MASS INDEX: 22.86 KG/M2 | SYSTOLIC BLOOD PRESSURE: 124 MMHG | DIASTOLIC BLOOD PRESSURE: 84 MMHG | HEART RATE: 127 BPM | WEIGHT: 125 LBS

## 2025-04-02 DIAGNOSIS — E55.9 VITAMIN D DEFICIENCY: ICD-10-CM

## 2025-04-02 DIAGNOSIS — M06.9 RHEUMATOID ARTHRITIS, UNSPECIFIED: ICD-10-CM

## 2025-04-02 DIAGNOSIS — Z79.899 LONG-TERM USE OF PLAQUENIL: ICD-10-CM

## 2025-04-02 DIAGNOSIS — Z79.620 ADALIMUMAB (HUMIRA) LONG-TERM USE: Primary | ICD-10-CM

## 2025-04-02 DIAGNOSIS — M05.79 RHEUMATOID ARTHRITIS INVOLVING MULTIPLE SITES WITH POSITIVE RHEUMATOID FACTOR (MULTI): ICD-10-CM

## 2025-04-02 PROCEDURE — 99215 OFFICE O/P EST HI 40 MIN: CPT | Performed by: INTERNAL MEDICINE

## 2025-04-02 RX ORDER — HYDROXYCHLOROQUINE SULFATE 200 MG/1
200 TABLET, FILM COATED ORAL DAILY
Qty: 90 TABLET | Refills: 3 | Status: SHIPPED | OUTPATIENT
Start: 2025-04-02

## 2025-04-05 LAB
25(OH)D3+25(OH)D2 SERPL-MCNC: 30 NG/ML (ref 30–100)
ALBUMIN SERPL-MCNC: 3.9 G/DL (ref 3.6–5.1)
ALP SERPL-CCNC: 70 U/L (ref 31–125)
ALT SERPL-CCNC: 7 U/L (ref 6–29)
ANION GAP SERPL CALCULATED.4IONS-SCNC: 10 MMOL/L (CALC) (ref 7–17)
AST SERPL-CCNC: 11 U/L (ref 10–30)
BASOPHILS # BLD AUTO: 32 CELLS/UL (ref 0–200)
BASOPHILS NFR BLD AUTO: 0.5 %
BILIRUB SERPL-MCNC: 0.4 MG/DL (ref 0.2–1.2)
BUN SERPL-MCNC: 6 MG/DL (ref 7–25)
CALCIUM SERPL-MCNC: 9.2 MG/DL (ref 8.6–10.2)
CHLORIDE SERPL-SCNC: 105 MMOL/L (ref 98–110)
CO2 SERPL-SCNC: 24 MMOL/L (ref 20–32)
CREAT SERPL-MCNC: 0.49 MG/DL (ref 0.5–0.96)
CRP SERPL-MCNC: 95.1 MG/L
EGFRCR SERPLBLD CKD-EPI 2021: 133 ML/MIN/1.73M2
EOSINOPHIL # BLD AUTO: 102 CELLS/UL (ref 15–500)
EOSINOPHIL NFR BLD AUTO: 1.6 %
ERYTHROCYTE [DISTWIDTH] IN BLOOD BY AUTOMATED COUNT: 12.7 % (ref 11–15)
ERYTHROCYTE [SEDIMENTATION RATE] IN BLOOD BY WESTERGREN METHOD: 68 MM/H
GLUCOSE SERPL-MCNC: 113 MG/DL (ref 65–99)
HCT VFR BLD AUTO: 30.7 % (ref 35–45)
HGB BLD-MCNC: 9.6 G/DL (ref 11.7–15.5)
IGNF NEG CNTRL BLD: NORMAL
LYMPHOCYTES # BLD AUTO: 1574 CELLS/UL (ref 850–3900)
LYMPHOCYTES NFR BLD AUTO: 24.6 %
M TB IFN-G BLD-IMP: NEGATIVE
MCH RBC QN AUTO: 25.3 PG (ref 27–33)
MCHC RBC AUTO-ENTMCNC: 31.3 G/DL (ref 32–36)
MCV RBC AUTO: 80.8 FL (ref 80–100)
MITOGEN IGNF.SPOT COUNT BLD: NORMAL
MONOCYTES # BLD AUTO: 352 CELLS/UL (ref 200–950)
MONOCYTES NFR BLD AUTO: 5.5 %
NEUTROPHILS # BLD AUTO: 4339 CELLS/UL (ref 1500–7800)
NEUTROPHILS NFR BLD AUTO: 67.8 %
PLATELET # BLD AUTO: 538 THOUSAND/UL (ref 140–400)
PMV BLD REES-ECKER: 9.2 FL (ref 7.5–12.5)
POTASSIUM SERPL-SCNC: 4 MMOL/L (ref 3.5–5.3)
PROT SERPL-MCNC: 7.5 G/DL (ref 6.1–8.1)
QUEST PANEL A SPOT COUNT: 0
QUEST PANEL B SPOT COUNT: 0
RBC # BLD AUTO: 3.8 MILLION/UL (ref 3.8–5.1)
SODIUM SERPL-SCNC: 139 MMOL/L (ref 135–146)
WBC # BLD AUTO: 6.4 THOUSAND/UL (ref 3.8–10.8)

## 2025-05-05 NOTE — PROGRESS NOTES
Subjective   Patient ID: Romaine Garcia is a 25 y.o. female who presents for No chief complaint on file..    HPI    Today     Fu Sero + (RF + Anti CCP +) RA    Better about 70%   Not much pain  Some stiffness   Continues with Humira  Say never got Actemra  Will get xray elbows before trying injections         -----------------------------------------------  Last seen 4-2-2025    Good until 6 mos and elbow can not move R elbow and l arm aches    Missed one or 2 humira    Ran out of plaquenil few mos ago     Eye appt few mos ago    Working nursing home      · Rheumatoid arthritis (714.0) (M06.9)   · Breast feeding status of mother (V24.1) (Z39.1)   · Vitamin D deficiency (268.9) (E55.9)   · Adalimumab (Humira) long-term use (V58.69) (Z79.620)   · Long-term use of Plaquenil (V58.69) (Z79.899)    Ok until 3 weeks pain swelling hands and wrists  Off Plaquenil for mos ran out   Humira helps     PAIN: wirst and r elbow shoulders hands  SWELLING: +  AM GEL: prolonged  SIDE EFFECTS OF MED: no    LAST LAB  Lab Results   Component Value Date     (H) 06/07/2019    SEDRATE 68 (H) 04/02/2025    CRP 95.1 (H) 04/02/2025         PHYSICAL EXAM  NODES -  HEART-  LUNGS-  ABDOMEN   VASCULAR-  NEURO -  SKIN-  JOINTS Active synovitis both shoulders with LOM   Both elbows with Fc 45 deg R 20 DEG L with synovitis  MCPS bari   PIPS  Knees ok  Syn thick ankles   There is currently no information documented on the homunculus. Go to the Rheumatology activity and complete the homunculus joint exam.   Assessment/Plan   There are no diagnoses linked to this encounter.      Last seen 1 year ago she says she has been a bad patient and has been noncompliant with appointments    She states she takes the Humira regularly every 2 weeks but when the Plaquenil ran out she never called for refill several months ago    She is now in the state of flare over the past several weeks    Examination reveals active synovitis with flexion contracture and  synovial thickening at the right elbow.  There is almost no range of motion of both wrists which are warm and MCPs 2 and 3 left greater than right. See pe for more details     Plan:  Patient is noncompliant with at least medications however she states she has been taking Humira    Back on Humira, never got Actemra RX    Xrays elbows consider injections

## 2025-05-07 ENCOUNTER — APPOINTMENT (OUTPATIENT)
Dept: RHEUMATOLOGY | Facility: CLINIC | Age: 25
End: 2025-05-07
Payer: COMMERCIAL

## 2025-05-07 ENCOUNTER — HOSPITAL ENCOUNTER (OUTPATIENT)
Dept: RADIOLOGY | Facility: CLINIC | Age: 25
Discharge: HOME | End: 2025-05-07
Payer: COMMERCIAL

## 2025-05-07 VITALS
OXYGEN SATURATION: 100 % | HEART RATE: 110 BPM | DIASTOLIC BLOOD PRESSURE: 65 MMHG | SYSTOLIC BLOOD PRESSURE: 101 MMHG | WEIGHT: 121 LBS | BODY MASS INDEX: 22.13 KG/M2

## 2025-05-07 DIAGNOSIS — M05.79 RHEUMATOID ARTHRITIS INVOLVING MULTIPLE SITES WITH POSITIVE RHEUMATOID FACTOR (MULTI): ICD-10-CM

## 2025-05-07 DIAGNOSIS — M05.79 RHEUMATOID ARTHRITIS INVOLVING MULTIPLE SITES WITH POSITIVE RHEUMATOID FACTOR (MULTI): Primary | ICD-10-CM

## 2025-05-07 PROCEDURE — 73080 X-RAY EXAM OF ELBOW: CPT | Mod: 50

## 2025-05-07 PROCEDURE — 99214 OFFICE O/P EST MOD 30 MIN: CPT | Performed by: INTERNAL MEDICINE

## 2025-05-09 ENCOUNTER — PHARMACY VISIT (OUTPATIENT)
Dept: PHARMACY | Facility: CLINIC | Age: 25
End: 2025-05-09
Payer: MEDICAID

## 2025-05-09 ENCOUNTER — SPECIALTY PHARMACY (OUTPATIENT)
Dept: PHARMACY | Facility: CLINIC | Age: 25
End: 2025-05-09

## 2025-05-09 PROCEDURE — RXMED WILLOW AMBULATORY MEDICATION CHARGE

## 2025-05-13 ENCOUNTER — TELEMEDICINE CLINICAL SUPPORT (OUTPATIENT)
Dept: PHARMACY | Facility: HOSPITAL | Age: 25
End: 2025-05-13
Payer: COMMERCIAL

## 2025-06-02 PROCEDURE — RXMED WILLOW AMBULATORY MEDICATION CHARGE

## 2025-06-06 ENCOUNTER — SPECIALTY PHARMACY (OUTPATIENT)
Dept: PHARMACY | Facility: CLINIC | Age: 25
End: 2025-06-06

## 2025-06-09 ENCOUNTER — TELEPHONE (OUTPATIENT)
Dept: OBSTETRICS AND GYNECOLOGY | Facility: CLINIC | Age: 25
End: 2025-06-09
Payer: COMMERCIAL

## 2025-06-09 ENCOUNTER — PHARMACY VISIT (OUTPATIENT)
Dept: PHARMACY | Facility: CLINIC | Age: 25
End: 2025-06-09
Payer: MEDICAID

## 2025-06-09 NOTE — TELEPHONE ENCOUNTER
Rn called pt and she is having a yellow itching discharge from vaginal area  Has not tried OTC to see if it is yeast and is agreeable to appt tomorrow for a vag swab for testing.

## 2025-06-09 NOTE — TELEPHONE ENCOUNTER
----- Message from Nurse Maryjane FLYNN sent at 6/6/2025  4:27 PM EDT -----  Regarding: RX request  Contact: [phone]  Patient called Friday at 4:15 asking for a RX to treat a possible yeast infection caused by a cream for bacterial infection. I advised her to be seen in 3 days since her symptoms were yellow discharge.

## 2025-06-13 ENCOUNTER — APPOINTMENT (OUTPATIENT)
Dept: OBSTETRICS AND GYNECOLOGY | Facility: CLINIC | Age: 25
End: 2025-06-13
Payer: COMMERCIAL

## 2025-06-20 ENCOUNTER — APPOINTMENT (OUTPATIENT)
Dept: RHEUMATOLOGY | Facility: CLINIC | Age: 25
End: 2025-06-20
Payer: COMMERCIAL

## 2025-06-27 ENCOUNTER — OFFICE VISIT (OUTPATIENT)
Dept: RHEUMATOLOGY | Facility: CLINIC | Age: 25
End: 2025-06-27
Payer: COMMERCIAL

## 2025-06-27 ENCOUNTER — APPOINTMENT (OUTPATIENT)
Dept: RHEUMATOLOGY | Facility: CLINIC | Age: 25
End: 2025-06-27
Payer: COMMERCIAL

## 2025-06-27 VITALS
HEIGHT: 62 IN | WEIGHT: 120 LBS | DIASTOLIC BLOOD PRESSURE: 57 MMHG | HEART RATE: 93 BPM | OXYGEN SATURATION: 99 % | BODY MASS INDEX: 22.08 KG/M2 | SYSTOLIC BLOOD PRESSURE: 97 MMHG

## 2025-06-27 DIAGNOSIS — M06.9 RHEUMATOID ARTHRITIS, UNSPECIFIED: ICD-10-CM

## 2025-06-27 PROCEDURE — 3008F BODY MASS INDEX DOCD: CPT | Performed by: INTERNAL MEDICINE

## 2025-06-27 PROCEDURE — 99214 OFFICE O/P EST MOD 30 MIN: CPT | Performed by: INTERNAL MEDICINE

## 2025-06-27 RX ORDER — HYDROXYCHLOROQUINE SULFATE 200 MG/1
200 TABLET, FILM COATED ORAL DAILY
Qty: 90 TABLET | Refills: 3 | Status: SHIPPED | OUTPATIENT
Start: 2025-06-27

## 2025-06-27 ASSESSMENT — PAIN SCALES - GENERAL: PAINLEVEL_OUTOF10: 0-NO PAIN

## 2025-06-27 NOTE — PROGRESS NOTES
Subjective   Patient ID: Romaine Garcia is a 25 y.o. female who presents for Injections (Patient would like to start cortisone injections today. (Bilateral, Elbow) ).    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 Both wrists are fixed as well           -----------------------------------------------  Last seen 4-2-2025    Good until 6 mos and elbow can not move R elbow and l arm aches    Missed one or 2 humira    Ran out of plaquenil few mos ago     Eye appt few mos ago    Working nursing home      · Rheumatoid arthritis (714.0) (M06.9)   · Breast feeding status of mother (V24.1) (Z39.1)   · Vitamin D deficiency (268.9) (E55.9)   · Adalimumab (Humira) long-term use (V58.69) (Z79.620)   · Long-term use of Plaquenil (V58.69) (Z79.899)    Ok until 3 weeks pain swelling hands and wrists  Off Plaquenil for mos ran out   Humira helps     PAIN: wirst and r elbow shoulders hands  SWELLING: +  AM GEL: prolonged  SIDE EFFECTS OF MED: no    LAST LAB  Lab Results   Component Value Date     (H) 06/07/2019    SEDRATE 68 (H) 04/02/2025    CRP 95.1 (H) 04/02/2025         PHYSICAL EXAM  NODES -  HEART-  LUNGS-  ABDOMEN   VASCULAR-  NEURO -  SKIN-  JOINTS Active synovitis both shoulders with LOM   Both elbows with Fc 45 deg R 20 DEG L with synovitis  MCPS bari   PIPS  Knees ok  Syn thick ankles   There is currently no information documented on the homunculus. Go to the Rheumatology activity and complete the homunculus joint exam.    Assessment/Plan   There are no diagnoses linked to this encounter.      Last seen 1 year ago she says she has been a bad patient and has been noncompliant with appointments    She states she takes the Humira regularly every 2 weeks but when the Plaquenil ran out she never called for refill several months ago    She is now in the state of flare over the past several weeks    Examination reveals active synovitis with flexion contracture and synovial thickening at the right elbow.  There is almost no range of motion of both wrists which are warm and MCPs 2 and 3 left greater than right. See pe for more details     Plan:    Will not inject elbows  Cont plaq  Cont humira  See 6 mos

## 2025-07-08 ENCOUNTER — SPECIALTY PHARMACY (OUTPATIENT)
Dept: PHARMACY | Facility: CLINIC | Age: 25
End: 2025-07-08

## 2025-07-08 PROCEDURE — RXMED WILLOW AMBULATORY MEDICATION CHARGE

## 2025-07-09 ENCOUNTER — PHARMACY VISIT (OUTPATIENT)
Dept: PHARMACY | Facility: CLINIC | Age: 25
End: 2025-07-09
Payer: MEDICAID

## 2025-08-06 ENCOUNTER — SPECIALTY PHARMACY (OUTPATIENT)
Dept: PHARMACY | Facility: CLINIC | Age: 25
End: 2025-08-06

## 2025-08-06 PROCEDURE — RXMED WILLOW AMBULATORY MEDICATION CHARGE

## 2025-08-07 ENCOUNTER — PHARMACY VISIT (OUTPATIENT)
Dept: PHARMACY | Facility: CLINIC | Age: 25
End: 2025-08-07
Payer: MEDICAID

## 2025-08-29 ENCOUNTER — SPECIALTY PHARMACY (OUTPATIENT)
Dept: PHARMACY | Facility: CLINIC | Age: 25
End: 2025-08-29

## 2025-11-07 ENCOUNTER — APPOINTMENT (OUTPATIENT)
Dept: RHEUMATOLOGY | Facility: CLINIC | Age: 25
End: 2025-11-07
Payer: COMMERCIAL